# Patient Record
Sex: MALE | Race: WHITE | Employment: OTHER | URBAN - METROPOLITAN AREA
[De-identification: names, ages, dates, MRNs, and addresses within clinical notes are randomized per-mention and may not be internally consistent; named-entity substitution may affect disease eponyms.]

---

## 2018-05-13 ENCOUNTER — HOSPITAL ENCOUNTER (EMERGENCY)
Age: 63
Discharge: HOME | End: 2018-05-13
Attending: EMERGENCY MEDICINE
Payer: SELF-PAY

## 2018-05-13 VITALS
WEIGHT: 185 LBS | OXYGEN SATURATION: 97 % | DIASTOLIC BLOOD PRESSURE: 80 MMHG | BODY MASS INDEX: 27.4 KG/M2 | SYSTOLIC BLOOD PRESSURE: 121 MMHG | HEART RATE: 82 BPM | TEMPERATURE: 97.9 F | HEIGHT: 69 IN | RESPIRATION RATE: 18 BRPM

## 2018-05-13 DIAGNOSIS — S61.411A LACERATION OF RIGHT HAND WITHOUT FOREIGN BODY, INITIAL ENCOUNTER: Primary | ICD-10-CM

## 2018-05-13 PROCEDURE — 77030018836 HC SOL IRR NACL ICUM -A

## 2018-05-13 PROCEDURE — 74011250637 HC RX REV CODE- 250/637: Performed by: EMERGENCY MEDICINE

## 2018-05-13 PROCEDURE — 74011250636 HC RX REV CODE- 250/636: Performed by: STUDENT IN AN ORGANIZED HEALTH CARE EDUCATION/TRAINING PROGRAM

## 2018-05-13 PROCEDURE — 90471 IMMUNIZATION ADMIN: CPT

## 2018-05-13 PROCEDURE — 99282 EMERGENCY DEPT VISIT SF MDM: CPT

## 2018-05-13 PROCEDURE — 90715 TDAP VACCINE 7 YRS/> IM: CPT | Performed by: STUDENT IN AN ORGANIZED HEALTH CARE EDUCATION/TRAINING PROGRAM

## 2018-05-13 PROCEDURE — 77030031132 HC SUT NYL COVD -A

## 2018-05-13 PROCEDURE — 75810000293 HC SIMP/SUPERF WND  RPR

## 2018-05-13 PROCEDURE — 74011000250 HC RX REV CODE- 250: Performed by: EMERGENCY MEDICINE

## 2018-05-13 RX ORDER — LIDOCAINE HYDROCHLORIDE 20 MG/ML
10 INJECTION, SOLUTION EPIDURAL; INFILTRATION; INTRACAUDAL; PERINEURAL
Status: COMPLETED | OUTPATIENT
Start: 2018-05-13 | End: 2018-05-13

## 2018-05-13 RX ADMIN — LIDOCAINE HYDROCHLORIDE 200 MG: 20 INJECTION, SOLUTION EPIDURAL; INFILTRATION; INTRACAUDAL; PERINEURAL at 13:27

## 2018-05-13 RX ADMIN — BACITRACIN ZINC, NEOMYCIN SULFATE, POLYMYXIN B SULFATE 1 PACKET: 3.5; 5000; 4 OINTMENT TOPICAL at 13:27

## 2018-05-13 RX ADMIN — TETANUS TOXOID, REDUCED DIPHTHERIA TOXOID AND ACELLULAR PERTUSSIS VACCINE, ADSORBED 0.5 ML: 5; 2.5; 8; 8; 2.5 SUSPENSION INTRAMUSCULAR at 13:22

## 2018-05-13 NOTE — ED PROVIDER NOTES
HPI Comments: 59 y.o. male with no significant past medical history who presents to the ED with chief complaint of laceration. Pt reports he was standing on a ladder holding a ~4 foot long piece of 50% concrete siding about an hour ago when he slipped and it fell onto his right arm. Pt now reports a laceration to the dorsal aspect of his right wrist. Pt denies any other injuries. Pt states his last tetanus was over 10 years ago. Pt states he is right handed. There are no other acute medical complaints voiced at this time. PCP: Cathi Leon MD    Note written by Jenny Valera, as dictated by Patricio Gallegos MD 1:22 PM     The history is provided by the patient. No past medical history on file. No past surgical history on file. No family history on file. Social History     Social History    Marital status:      Spouse name: N/A    Number of children: N/A    Years of education: N/A     Occupational History    Not on file. Social History Main Topics    Smoking status: Not on file    Smokeless tobacco: Not on file    Alcohol use Not on file    Drug use: Not on file    Sexual activity: Not on file     Other Topics Concern    Not on file     Social History Narrative         ALLERGIES: Review of patient's allergies indicates no known allergies. Review of Systems   Constitutional: Negative. Negative for appetite change, fever and unexpected weight change. HENT: Negative. Negative for ear pain, hearing loss, nosebleeds, rhinorrhea, sore throat and trouble swallowing. Respiratory: Negative. Negative for cough, chest tightness and shortness of breath. Cardiovascular: Negative. Negative for chest pain and palpitations. Gastrointestinal: Negative. Negative for abdominal distention, abdominal pain, blood in stool and vomiting. Endocrine: Negative. Genitourinary: Negative for dysuria and hematuria. Musculoskeletal: Negative.   Negative for back pain and myalgias. Skin: Positive for wound (laceration dorsal aspect of right wrist). Negative for rash. Allergic/Immunologic: Negative. Neurological: Negative. Negative for dizziness, syncope, weakness and numbness. Hematological: Negative. Psychiatric/Behavioral: Negative. All other systems reviewed and are negative. Vitals:    05/13/18 1304   BP: 121/80   Pulse: 82   Resp: 18   Temp: 97.9 °F (36.6 °C)   SpO2: 97%   Weight: 83.9 kg (185 lb)   Height: 5' 9\" (1.753 m)            Physical Exam   Constitutional: He is oriented to person, place, and time. He appears well-developed and well-nourished. No distress. HENT:   Head: Normocephalic and atraumatic. Right Ear: External ear normal.   Left Ear: External ear normal.   Nose: Nose normal.   Mouth/Throat: Oropharynx is clear and moist.   Eyes: Conjunctivae and EOM are normal. Pupils are equal, round, and reactive to light. Neck: Normal range of motion. Neck supple. No JVD present. No thyromegaly present. Cardiovascular: Normal rate, regular rhythm, normal heart sounds and intact distal pulses. No murmur heard. Pulmonary/Chest: Effort normal and breath sounds normal. No respiratory distress. He has no wheezes. He has no rales. Abdominal: Soft. Bowel sounds are normal. He exhibits no distension. There is no tenderness. Musculoskeletal: Normal range of motion. He exhibits no edema. No tendon injury. No foreign body. Neurovascularly intact distally. Neurological: He is alert and oriented to person, place, and time. No cranial nerve deficit. Skin: Skin is warm and dry. No rash noted. 4 cm transverse laceration to dorsal aspect of right hand with mild active venous bleeding. Psychiatric: He has a normal mood and affect. His behavior is normal. Thought content normal.   Nursing note and vitals reviewed.      Note written by Jenny Jin, as dictated by Crystal Ortez MD 1:23 PM    [facility]      ED Course Wound Repair  Date/Time: 5/13/2018 1:54 PM  Performed by: Lyric provider: Kellee Johnston MD  Preparation: skin prepped with Shur-Clens  Location details: right wrist  Wound length:2.6 - 7.5 cm    Anesthesia:  Local Anesthetic: lidocaine 2% without epinephrine  Anesthetic total: 5 mL  Irrigation solution: saline  Skin closure: 4-0 nylon  Number of sutures: 7  Technique: interrupted  Patient tolerance: Patient tolerated the procedure well with no immediate complications    Note written by Jenny Prado, as dictated by Kellee Johnston MD 1:54 PM    PROGRESS NOTE:  2:06 PM   Will discharge home with no prescriptions. Tetanus was updated.

## 2018-05-13 NOTE — DISCHARGE INSTRUCTIONS
Cuts on the Hand Closed With Stitches: Care Instructions  Your Care Instructions    A cut on your hand can be on your fingers, your thumb, or the front or back of your hand. Sometimes a cut can injure the tendons, blood vessels, or nerves of your hand. The doctor used stitches to close the cut. Using stitches also helps the cut heal and reduces scarring. The doctor may have given you a splint to help prevent you from moving your hand, fingers, or thumb. If the cut went deep and through the skin, the doctor put in two layers of stitches. The deeper layer brings the deep part of the cut together. These stitches will dissolve and don't need to be removed. The stitches in the upper layer are the ones you see on the cut. You will probably have a bandage. You will need to have the stitches removed, usually in 7 to 14 days. The doctor may suggest that you see a hand specialist if the cut is very deep or if you have trouble moving your fingers or have less feeling in your hand. The doctor has checked you carefully, but problems can develop later. If you notice any problems or new symptoms, get medical treatment right away. Follow-up care is a key part of your treatment and safety. Be sure to make and go to all appointments, and call your doctor if you are having problems. It's also a good idea to know your test results and keep a list of the medicines you take. How can you care for yourself at home? · Keep the cut dry for the first 24 to 48 hours. After this, you can shower if your doctor okays it. Pat the cut dry. · Don't soak the cut, such as in a bathtub. Your doctor will tell you when it's safe to get the cut wet. · If your doctor told you how to care for your cut, follow your doctor's instructions. If you did not get instructions, follow this general advice:  ¨ After the first 24 to 48 hours, wash around the cut with clean water 2 times a day.  Don't use hydrogen peroxide or alcohol, which can slow healing. ¨ You may cover the cut with a thin layer of petroleum jelly, such as Vaseline, and a nonstick bandage. ¨ Apply more petroleum jelly and replace the bandage as needed. · Prop up the sore hand on a pillow anytime you sit or lie down during the next 3 days. Try to keep it above the level of your heart. This will help reduce swelling. · Avoid any activity that could cause your cut to reopen. · Do not remove the stitches on your own. Your doctor will tell you when to come back to have the stitches removed. · Be safe with medicines. Take pain medicines exactly as directed. ¨ If the doctor gave you a prescription medicine for pain, take it as prescribed. ¨ If you are not taking a prescription pain medicine, ask your doctor if you can take an over-the-counter medicine. When should you call for help? Call your doctor now or seek immediate medical care if:  ? · You have new pain, or your pain gets worse. ? · The skin near the cut is cold or pale or changes color. ? · You have tingling, weakness, or numbness near the cut.   ? · The cut starts to bleed, and blood soaks through the bandage. Oozing small amounts of blood is normal.   ? · You have trouble moving the area of the hand near the cut.   ? · You have symptoms of infection, such as:  ¨ Increased pain, swelling, warmth, or redness around the cut. ¨ Red streaks leading from the cut. ¨ Pus draining from the cut. ¨ A fever. ? Watch closely for changes in your health, and be sure to contact your doctor if:  ? · You do not get better as expected. Where can you learn more? Go to http://pete-lamar.info/. Enter T250 in the search box to learn more about \"Cuts on the Hand Closed With Stitches: Care Instructions. \"  Current as of: March 20, 2017  Content Version: 11.4  © 3491-4168 [employer].  Care instructions adapted under license by [employer] (which disclaims liability or warranty for this information). If you have questions about a medical condition or this instruction, always ask your healthcare professional. [place] [address] any warranty or liability for your use of this information.

## 2024-06-07 ENCOUNTER — HOSPITAL ENCOUNTER (OUTPATIENT)
Age: 69
Discharge: HOME | End: 2024-06-07
Payer: COMMERCIAL

## 2024-06-07 ENCOUNTER — OFFICE VISIT (OUTPATIENT)
Dept: FAMILY MEDICINE CLINIC | Facility: CLINIC | Age: 69
End: 2024-06-07
Payer: MEDICARE

## 2024-06-07 ENCOUNTER — APPOINTMENT (OUTPATIENT)
Dept: GENERAL RADIOLOGY | Age: 69
End: 2024-06-07
Attending: NURSE PRACTITIONER
Payer: COMMERCIAL

## 2024-06-07 VITALS
BODY MASS INDEX: 31.14 KG/M2 | RESPIRATION RATE: 18 BRPM | TEMPERATURE: 98 F | SYSTOLIC BLOOD PRESSURE: 158 MMHG | HEIGHT: 73 IN | HEART RATE: 64 BPM | WEIGHT: 235 LBS | DIASTOLIC BLOOD PRESSURE: 92 MMHG | OXYGEN SATURATION: 95 %

## 2024-06-07 VITALS
BODY MASS INDEX: 31.81 KG/M2 | RESPIRATION RATE: 18 BRPM | SYSTOLIC BLOOD PRESSURE: 143 MMHG | HEIGHT: 73 IN | DIASTOLIC BLOOD PRESSURE: 90 MMHG | OXYGEN SATURATION: 97 % | HEART RATE: 80 BPM | TEMPERATURE: 98 F | WEIGHT: 240 LBS

## 2024-06-07 DIAGNOSIS — Z02.9 ADMINISTRATIVE ENCOUNTER: Primary | ICD-10-CM

## 2024-06-07 DIAGNOSIS — M79.5 FOREIGN BODY (FB) IN SOFT TISSUE: Primary | ICD-10-CM

## 2024-06-07 DIAGNOSIS — S91.302A OPEN WOUND OF LEFT FOOT, INITIAL ENCOUNTER: ICD-10-CM

## 2024-06-07 DIAGNOSIS — S91.302A WOUND OF LEFT FOOT: ICD-10-CM

## 2024-06-07 PROBLEM — I25.10 CORONARY ATHEROSCLEROSIS: Status: ACTIVE | Noted: 2017-06-12

## 2024-06-07 PROCEDURE — 99203 OFFICE O/P NEW LOW 30 MIN: CPT | Performed by: NURSE PRACTITIONER

## 2024-06-07 PROCEDURE — 73630 X-RAY EXAM OF FOOT: CPT | Performed by: NURSE PRACTITIONER

## 2024-06-07 RX ORDER — ASPIRIN 81 MG/1
81 TABLET ORAL DAILY
COMMUNITY

## 2024-06-07 RX ORDER — SIMVASTATIN 20 MG
20 TABLET ORAL DAILY
COMMUNITY

## 2024-06-07 RX ORDER — EZETIMIBE 10 MG/1
10 TABLET ORAL
COMMUNITY
Start: 2024-04-01 | End: 2024-06-07

## 2024-06-07 RX ORDER — HYDROCORTISONE ACETATE 25 MG/1
SUPPOSITORY RECTAL
COMMUNITY
Start: 2024-04-24

## 2024-06-07 RX ORDER — CEPHALEXIN 500 MG/1
500 CAPSULE ORAL 3 TIMES DAILY
Qty: 21 CAPSULE | Refills: 0 | Status: SHIPPED | OUTPATIENT
Start: 2024-06-07 | End: 2024-06-14

## 2024-06-07 NOTE — DISCHARGE INSTRUCTIONS
Follow-up with your primary care provider for all of your healthcare needs  Keep the foot clean and dry, apply topical Neosporin or bacitracin area  Finish the full course antibiotics  Return to the emergency room for symptoms or concerns

## 2024-06-07 NOTE — PROGRESS NOTES
Toby Vizcarra is a 69 year old male who presents to [facility] with c/o possible FB interdigital webspace L foot between 3/4 digit x 1 week.  Noted blood at area with small puncture wound, topical neosporin and hydrogen peroxide without healing. Sensation of FB at site.  Denies recalled injury/wound, however admits [place] and may have been distracted with other activities.        Denies fever, no chills/sweats, no purulent discharge, no red streaking.   Last TDAP in 2017.   Denies h/o DM, no h/o PVD nor neuropathy.   H/o CAD on anti HTN, cholesterol, and antiplatelet tx.     OBJECTIVE:   /90   Pulse 80   Temp 97.9 °F (36.6 °C)   Resp 18   Ht 6' 1\" (1.854 m)   Wt 240 lb (108.9 kg)   SpO2 97%   BMI 31.66 kg/m²   GENERAL A & O X 3 in nad, pleasant, comfortable  SKIN (+) 2 mm puncture wound vs. Fissure interdigital webspace 3/4 digit LF, maceration at borders, dried blood distally, erythema/mild edema at inferior margin of wound.  No visible FB, cap refill < 2 sec, no pain with lateral compression of metatarsals, no lymphangitic streaking nor induration.   DP pulse 2+, sensation grossly intact.     Accompanied by: spouse  After triage, higher acuity of care was recommended to Toby Vizcarra today.   Rationale: Need for further evaluation and management outside the scope of practice for the walk in clinic  Site recommendation: Referred to [facility] for imaging to r/o FB.  Advised to stop hydrogen peroxide as this is impeding healing.  Report given to Himanshu SAWANT.    Pt discharged in stable condition via private car with spouse.   Electronically signed,   Sasha Craft PA-C,  6/7/2024, 12:34 PM

## 2024-06-07 NOTE — ED INITIAL ASSESSMENT (HPI)
Pt with co pain to the left foot. Noticed it bleeding the last couple of days and noted to have a small hole between toes.

## 2024-06-07 NOTE — ED PROVIDER NOTES
Patient Seen in: [facility]      History     Chief Complaint   Patient presents with    Pain     Stated Complaint: FOOT PAIN    Subjective:   HPI    69-year-old male presents to me care with possible foreign body between the fourth and fifth digits of the left foot.  P states he has had pain between the digits for the last week.  Unsure if he stepped on something has been wearing his foot flops and going barefoot more recent.  Did notice a small puncture wound to the foot area with some bleeding.  No redness pain with walking.  No issues with concerns.  The patient's medication list, past medical history and social history elements as listed in today's nurse's notes were reviewed and agreed (except as otherwise stated in the HPI).  The patient's family history reviewed and determined to be noncontributory to the presenting problem.      Objective:   Past Medical History:    Hyperlipidemia              Past Surgical History:   Procedure Laterality Date    Heart surgery      Other surgical history      TAVR                Social History     Socioeconomic History    Marital status:      Social Determinants of Health     Financial Resource Strain: Medium Risk (4/16/2022)    Received from [facility]    Financial Resource Strain     Financial Concerns: 2   Transportation Needs: Unknown (10/19/2021)    Received from [facility]    Transportation Needs     Lack of Transportation (Medical): 0   Physical Activity: Unknown (10/19/2021)    Received from [facility]    Physical Activity     Calculated Minutes of Exercise per Week:: 0   Stress: Unknown (10/19/2021)    Received from [facility]    Stress     Patient Reported Major Stressor(s): 0     Patient Reported Strengths: 1     Patient Reported Source(s) of Support: 1   Social Connections: Unknown (10/19/2021)    Received from [facility]    Social  Connections     Social Connection Calculated Score: 0   Housing Stability: Unknown (10/19/2021)    Received from [facility]    Housing Stability     Housing Concerns: 0              Review of Systems    Positive for stated complaint: FOOT PAIN  Other systems are as noted in HPI.  Constitutional and vital signs reviewed.      All other systems reviewed and negative except as noted above.    Physical Exam     ED Triage Vitals [06/07/24 1300]   BP (!) 158/92   Pulse 64   Resp 18   Temp 97.5 °F (36.4 °C)   Temp src Temporal   SpO2 95 %   O2 Device None (Room air)       Current Vitals:   Vital Signs  BP: (!) 158/92  Pulse: 64  Resp: 18  Temp: 97.5 °F (36.4 °C)  Temp src: Temporal    Oxygen Therapy  SpO2: 95 %  O2 Device: None (Room air)            Physical Exam    GENERAL: The patient is well-developed well-nourished nontoxic, non-ill-appearing    CHEST/LUNGS: There is no respiratory distress noted.  HEART/CARDIOVASCULAR:.  There is no tachycardia.   SKIN: There is no rash.  Small puncture wound is noted between the fourth and fifth digits of the left foot.  No surrounding erythema no drainage noted.  No abscess no foreign bodies appreciated  NEURO: The patient is awake, alert, and oriented.  The patient is cooperative.    ED Course   Labs Reviewed - No data to display     XR FOOT, COMPLETE (MIN 3 VIEWS), LEFT (CPT=73630)    Result Date: 6/7/2024  PROCEDURE:  XR FOOT, COMPLETE (MIN 3 VIEWS), LEFT (CPT=73630)  TECHNIQUE:  AP, oblique, and lateral views were obtained.  COMPARISON:  None.  INDICATIONS:  FOOT PAIN  PATIENT STATED HISTORY: (As transcribed by Technologist)  The patient has left foot pain for several days with no known injury or trauma he has a small hole between his toes. He noticed bleeding to the foot a couple days ago.    FINDINGS:  No acute fractures or osseous lesions are identified.  Phalangeal relationships are within normal limits.  No significant ankle joint effusion.  Calcaneal  enthesophytes.  Osteoarthritic changes greatest at the 1st MTP joints.            CONCLUSION:  No acute fractures.  Osteoarthritic changes greatest the 1st MTP joint.   LOCATION:  [facility]   Dictated by (CST): Shelby Rodriguez MD on 6/07/2024 at 1:36 PM     Finalized by (CST): Shelby Rodriguez MD on 6/07/2024 at 1:38 PM               MDM   Pertinent Labs & Imaging studies reviewed. (See chart for details)  Differential diagnosis considered but not limited to: Foreign body, infected wound, puncture wound  Patient coming in with possible foreign body to the foot.  Radiology see above. Will treat for possible foreign body of foot. Will discharge on Keflex. Patient is comfortable with this plan.  Overall Pt looks good. Non-toxic, well-hydrated and in no respiratory distress. Vital signs are reassuring. Exam is reassuring. I do not believe pt  requires and additional  diagnostic studiesor intervention. I believe pt  can be discharged home to continue evaluation as an outpatient. Follow-up provider given. Discharge instructions given and reviewed. Return for any problems. All understand and agreewith the plan.    Please note that this report has been produced using speech recognition software and may contain errors related to that system including, but not limited to, errors in grammar, punctuation, and spelling, as well as words and phrases that possibly may have been recognized inappropriately.  If there are any questions or concerns, contact the dictating provider for clarification.    Note to patient: The 21st Century Cures Act makes medical notes like these available to patients in the interest of transparency. However, this is a medical document intended as peer to peer communication. It is written in medical language and may contain abbreviations or verbiage that are unfamiliar. It may appear blunt or direct. Medical documents are intended to carry relevant information, facts as evident, and the clinical opinion of  the practitioner.                                    Medical Decision Making      Disposition and Plan     Clinical Impression:  1. Foreign body (FB) in soft tissue    2. Open wound of left foot, initial encounter         Disposition:  Discharge  6/7/2024  1:43 pm    Follow-up:  No follow-up provider specified.        Medications Prescribed:  Discharge Medication List as of 6/7/2024  1:44 PM        START taking these medications    Details   cephalexin 500 MG Oral Cap Take 1 capsule (500 mg total) by mouth 3 (three) times daily for 7 days., Normal, Disp-21 capsule, R-0

## 2024-06-17 ENCOUNTER — OFFICE VISIT (OUTPATIENT)
Dept: FAMILY MEDICINE CLINIC | Facility: CLINIC | Age: 69
End: 2024-06-17
Payer: MEDICARE

## 2024-06-17 VITALS
SYSTOLIC BLOOD PRESSURE: 137 MMHG | DIASTOLIC BLOOD PRESSURE: 100 MMHG | WEIGHT: 235 LBS | RESPIRATION RATE: 18 BRPM | TEMPERATURE: 97 F | BODY MASS INDEX: 31 KG/M2 | HEART RATE: 77 BPM | OXYGEN SATURATION: 96 %

## 2024-06-17 DIAGNOSIS — M79.672 LEFT FOOT PAIN: ICD-10-CM

## 2024-06-17 DIAGNOSIS — R03.0 ELEVATED BLOOD PRESSURE READING: Primary | ICD-10-CM

## 2024-06-17 DIAGNOSIS — T14.8XXA ABRASION: ICD-10-CM

## 2024-06-17 PROCEDURE — 99213 OFFICE O/P EST LOW 20 MIN: CPT | Performed by: NURSE PRACTITIONER

## 2024-06-17 NOTE — PROGRESS NOTES
CHIEF COMPLAINT:     Chief Complaint   Patient presents with    Foot Pain          HPI:    Toby Vizcarra is a 69 year old male who presents for evaluation of a skin abrasion in between 3rd and 4th toe. Pt was put on keflex over a week ago and completed medication, but is still having discomfort. Area per patient has improved greatly, but is  and draining some. Pertinent negatives include no anorexia, congestion, cough, diarrhea, eye pain, facial edema, fatigue, fever, joint pain, rhinorrhea, shortness of breath, sore throat or vomiting.      Current Outpatient Medications   Medication Sig Dispense Refill    aspirin 81 MG Oral Tab EC Take 1 tablet (81 mg total) by mouth daily.      hydrocortisone 2.5 % External Cream APPLY TOPICALLY DAILY TO AFFECTED PERIANAL AREA UP TO THREE TIMES DAILY      hydrocortisone 25 MG Rectal Suppos UNWRAP AND INSERT OR APPLY 1 SUPPOSITORY RECTALLY NIGHTLY FOR 60 DAYS.      simvastatin 20 MG Oral Tab Take 1 tablet (20 mg total) by mouth daily.        Past Medical History:    Hyperlipidemia      Past Surgical History:   Procedure Laterality Date    Heart surgery      Other surgical history      TAVR      History reviewed. No pertinent family history.   Social History     Socioeconomic History    Marital status:      Social Determinants of Health     Financial Resource Strain: Medium Risk (4/16/2022)    Received from [facility]    Financial Resource Strain     Financial Concerns: 2   Transportation Needs: Unknown (10/19/2021)    Received from [facility]    Transportation Needs     Lack of Transportation (Medical): 0   Physical Activity: Unknown (10/19/2021)    Received from [facility]    Physical Activity     Calculated Minutes of Exercise per Week:: 0   Stress: Unknown (10/19/2021)    Received from [facility]    Stress     Patient Reported Major Stressor(s): 0     Patient Reported  Strengths: 1     Patient Reported Source(s) of Support: 1   Social Connections: Unknown (10/19/2021)    Received from [facility]    Social Connections     Social Connection Calculated Score: 0   Housing Stability: Unknown (10/19/2021)    Received from [facility]    Housing Stability     Housing Concerns: 0         REVIEW OF SYSTEMS:   GENERAL: feels well otherwise, no fever, no chills.  SKIN: Per HPI. No edema. No ulcerations.  HEENT: Denies rhinorrhea, edema of the lips or swelling of throat.  CARDIOVASCULAR: Denies chest pains or palpitations.  LUNGS: Denies shortness of breath with exertion or rest. No cough or wheezing.  LYMPH: Denies enlargement of the lymph nodes.  NEURO: Denies abnormal sensation, tingling of the skin, or numbness.      EXAM:   BP (!) 137/100   Pulse 77   Temp 97.3 °F (36.3 °C)   Resp 18   Wt 235 lb (106.6 kg)   SpO2 96%   BMI 31.00 kg/m²   GENERAL: well developed, well nourished,in no apparent distress  SKIN: between 3rd and 4th toes small pen point size hole that has granulated tissue noted inside. No drainage noted, but + tenderness with palpitation. Area has no redness or warmth. No edema.  EYES: PERRLA, EOMI, conjunctiva are clear  HENT: Head atraumatic, normocephalic. TM's WNL bilaterally. Normal external nose. Nasal mucosa pink without edema. No erythema of the throat. Oropharynx moist without lesions.  LUNGS: Clear to auscultation bilaterally.  No wheezing, rhonchi, or rales.  No diminished breath sounds. No increased work of breathing.   CARDIO: RRR without murmur  JOINTS: normal ROM   LYMPH: No  lymphadenopathy.     ASSESSMENT AND PLAN:   Toby Vizcarra is a 69 year old male who presents for evaluation of a rash. Findings are consistent with:    ASSESSMENT:  Encounter Diagnoses   Name Primary?    Elevated blood pressure reading Yes    Left foot pain     Abrasion        PLAN: Meds as listed below.  Comfort measures as described in  Patient Instructions.  Skin care discussed with patient.   Educated on following up with podiatry- pt is from [place] and will follow up when he goes back home  Educated on supportive measures: ibuprofen/tylenol, epsom salt soaks, keeping area clean and dry  Educated on s/s of worsening sx and when to seek higher level of care  Follow up with pcp if not improving    Meds & Refills for this Visit:  Requested Prescriptions      No prescriptions requested or ordered in this encounter         Risk and benefits of medication discussed.     The patient indicates understanding of these issues and agrees to the plan.  The patient is asked to return in 3 days if sx's persist or worsen.

## 2024-06-24 ENCOUNTER — TELEPHONE (OUTPATIENT)
Dept: VASCULAR SURGERY | Facility: CLINIC | Age: 69
End: 2024-06-24
Payer: COMMERCIAL

## 2024-06-24 NOTE — TELEPHONE ENCOUNTER
Called patient to inform him of his appointment on 06/28/24 at 10:00 am. Patient verbalized understanding and will call with any questions.

## 2024-06-24 NOTE — PROGRESS NOTES
[facility] - PODIATRY    Today's Date: 06/28/2024     Patient Name: Abisai Bertrand  MRN: [id number]  CSN: [id number]  PCP: System, Provider Not In  Referring Provider: Referring, Self    SUBJECTIVE     Chief Complaint   Patient presents with   • Follow-up     Pt is here today for self referral from injury had a splinter in foot doesn't think he got it all has been doctored up by  in [place] had xrays but doesn't have a disc with him. Has an open wound between 3rd and fourth toe-pt reports pain level 2/10     HPI: Abisai Bertrand, a 69 y.o.male, comes to clinic as a(n) new patient complaining of foot pain. Patient has h/o high cholesterol . Patient reports he had a wooden splinter between his left 3rd and 4th toes approximately 3 weeks ago. Reports he was able to remove part of the splinter, but continues to have pain.  He is currently traveling across the country in an RV and was previously seen in [facility]. He had previous xrays of left foot and was given oral antibiotics.  Reports he has been soaking the foot in Epsom salt daily. Patient is non-diabetic. After completing his antibiotics he still has an open wound between the toes and pain upon palpation of the plantar surface of the foot. Admits pain at 2/10 level, described as shooting and aching, and centered around left 3rd and 4th toe interspace . Relates previous treatment(s) including oral antibiotics and urgent care . Denies any constitutional symptoms. No other pedal complaints at this time.    Past Medical History:   Diagnosis Date   • High cholesterol      Past Surgical History:   Procedure Laterality Date   • CARDIAC SURGERY     • CARDIAC VALVE SURGERY     • KNEE SURGERY      Dr. Perez in [place]     History reviewed. No pertinent family history.  Social History     Socioeconomic History   • Marital status:    Tobacco Use   • Smoking status: Never     Passive exposure: Never   • Smokeless tobacco: Never   Vaping Use   • Vaping  status: Never Used   Substance and Sexual Activity   • Alcohol use: Not Currently   • Drug use: Defer   • Sexual activity: Defer     No Known Allergies  Current Outpatient Medications   Medication Sig Dispense Refill   • aspirin 81 MG EC tablet Take 1 tablet by mouth Daily.     • ezetimibe (ZETIA) 10 MG tablet TAKE 1 TABLET BY MOUTH DAILY BEFORE A MEAL     • simvastatin (ZOCOR) 20 MG tablet Take 1 tablet by mouth.     • doxycycline (VIBRAMYCIN) 100 MG capsule Take 1 capsule by mouth 2 (Two) Times a Day for 10 days. 20 capsule 0   • mupirocin (BACTROBAN) 2 % cream Apply 1 Application topically to the appropriate area as directed 3 (Three) Times a Day. 30 g 0     No current facility-administered medications for this visit.     Review of Systems   Constitutional:  Negative for activity change.   HENT:  Negative for congestion.    Respiratory:  Negative for apnea and shortness of breath.    Gastrointestinal:  Negative for abdominal pain.   Musculoskeletal:  Negative for arthralgias and back pain.        Foot pain     Skin:  Positive for wound (interdigital left 3rd and 4th toe).   Psychiatric/Behavioral:  Negative for agitation, behavioral problems and confusion.      OBJECTIVE     Vitals:    06/28/24 0948   BP: 130/88   Pulse: 67   SpO2: 97%       PHYSICAL EXAM  GEN:   Accompanied by none.     Foot/Ankle Exam    GENERAL  Appearance:  appears stated age  Orientation:  AAOx3  Affect:  appropriate  Gait:  unimpaired  Assistance:  independent  Right shoe gear: casual shoe  Left shoe gear: casual shoe    VASCULAR     Right Foot Vascularity   Dorsalis pedis:  2+  Posterior tibial:  2+  Skin temperature:  warm  Edema grading:  None  CFT:  3  Pedal hair growth:  Present  Varicosities:  none     Left Foot Vascularity   Dorsalis pedis:  2+  Posterior tibial:  2+  Skin temperature:  warm  Edema grading:  None  CFT:  3  Pedal hair growth:  Present  Varicosities:  none     NEUROLOGIC     Right Foot Neurologic   Normal sensation     Light touch sensation: normal  Vibratory sensation: normal  Hot/Cold sensation: normal  Protective Sensation using [place] Monofilament:   Sites intact: 10  Sites tested: 10     Left Foot Neurologic   Normal sensation    Light touch sensation: normal  Vibratory sensation: normal  Hot/Cold sensation:  normal  Protective Sensation using [place] Monofilament:   Sites intact: 10  Sites tested: 10    MUSCULOSKELETAL     Left Foot Musculoskeletal   Ecchymosis:  none  Tenderness:  toe 3 tenderness and toe 4 tenderness  Arch:  Normal    MUSCLE STRENGTH     Left Foot Muscle Strength   Foot dorsiflexion:  5  Foot plantar flexion:  5  Foot inversion:  5  Foot eversion:  5    RANGE OF MOTION     Left Foot Range of Motion   Foot and ankle ROM within normal limits      DERMATOLOGIC        Left Foot Dermatologic   Skin  Left foot skin is intact.     Image:     RADIOLOGY/NUCLEAR:  XR FOOT, COMPLETE (MIN 3 VIEWS), LEFT (CPT=73630)    Result Date: 6/7/2024  Narrative: PROCEDURE:  XR FOOT, COMPLETE (MIN 3 VIEWS), LEFT (CPT=73630) TECHNIQUE:  AP, oblique, and lateral views were obtained. COMPARISON:  None. INDICATIONS:  FOOT PAIN PATIENT STATED HISTORY: (As transcribed by Technologist)  The patient has left foot pain for several days with no known injury or trauma he has a small hole between his toes. He noticed bleeding to the foot a couple days ago.    FINDINGS:   No acute fractures or osseous lesions are identified.  Phalangeal relationships are within normal limits.  No significant ankle joint effusion.  Calcaneal enthesophytes.  Osteoarthritic changes greatest at the 1st MTP joints.        Impression: CONCLUSION:  No acute fractures.  Osteoarthritic changes greatest the 1st MTP joint. LOCATION:  [facility] Dictated by (CST): Cholo Oro MD on 6/07/2024 at 1:36 PM     Finalized by (CST): Cholo Oro MD on 6/07/2024 at 1:38 PM        LABORATORY/CULTURE RESULTS:      PATHOLOGY RESULTS:       ASSESSMENT/PLAN      Diagnoses and all orders for this visit:    1. Foreign body (FB) in soft tissue (Primary)  -     doxycycline (VIBRAMYCIN) 100 MG capsule; Take 1 capsule by mouth 2 (Two) Times a Day for 10 days.  Dispense: 20 capsule; Refill: 0  -     mupirocin (BACTROBAN) 2 % cream; Apply 1 Application topically to the appropriate area as directed 3 (Three) Times a Day.  Dispense: 30 g; Refill: 0    2. Left foot pain    3. Splinter of left foot, initial encounter    4. Open wound of left foot, initial encounter      Comprehensive lower extremity examination and evaluation was performed.  Discussed findings and treatment plan including risks, benefits, and treatment options with patient in detail. Patient agreed with treatment plan.  Patient will increase Epsom salt soaks to 3 times daily.   Begin use of mupirocin ointment to open area.   Begin use of doxycycline orally BID.    Return in 4 days for reevaluation.    An After Visit Summary was printed and given to the patient at discharge, including (if requested) any available informative/educational handouts regarding diagnosis, treatment, or medications. All questions were answered to patient/family satisfaction. Should symptoms fail to improve or worsen they agree to call or return to clinic or to go to the Emergency Department. Discussed the importance of following up with any needed screening tests/labs/specialist appointments and any requested follow-up recommended by me today. Importance of maintaining follow-up discussed and patient accepts that missed appointments can delay diagnosis and potentially lead to worsening of conditions.  Return in about 4 days (around 7/2/2024) for Follow-up with Podiatry BETI, Recheck., or sooner if acute issues arise.      This document has been electronically signed by BETI Vieira on June 28, 2024 10:44 CDT

## 2024-06-27 ENCOUNTER — TELEPHONE (OUTPATIENT)
Age: 69
End: 2024-06-27
Payer: COMMERCIAL

## 2024-06-27 NOTE — TELEPHONE ENCOUNTER
Hub to relay  Called patient regarding appt on 06/28/2024. Left message for patient to return call if any questions or concerns arise.

## 2024-06-28 ENCOUNTER — OFFICE VISIT (OUTPATIENT)
Age: 69
End: 2024-06-28
Payer: COMMERCIAL

## 2024-06-28 VITALS
DIASTOLIC BLOOD PRESSURE: 88 MMHG | HEIGHT: 73 IN | OXYGEN SATURATION: 97 % | SYSTOLIC BLOOD PRESSURE: 130 MMHG | BODY MASS INDEX: 31.14 KG/M2 | WEIGHT: 235 LBS | HEART RATE: 67 BPM

## 2024-06-28 DIAGNOSIS — S90.852A SPLINTER OF LEFT FOOT, INITIAL ENCOUNTER: ICD-10-CM

## 2024-06-28 DIAGNOSIS — S91.302A OPEN WOUND OF LEFT FOOT, INITIAL ENCOUNTER: ICD-10-CM

## 2024-06-28 DIAGNOSIS — M79.5 FOREIGN BODY (FB) IN SOFT TISSUE: Primary | ICD-10-CM

## 2024-06-28 DIAGNOSIS — M79.672 LEFT FOOT PAIN: ICD-10-CM

## 2024-06-28 PROBLEM — Z96.659 S/P TOTAL KNEE ARTHROPLASTY: Status: ACTIVE | Noted: 2021-04-14

## 2024-06-28 PROBLEM — Z95.2 S/P AORTIC VALVE REPLACEMENT: Status: ACTIVE | Noted: 2017-06-12

## 2024-06-28 PROBLEM — K21.9 ESOPHAGEAL REFLUX: Status: ACTIVE | Noted: 2017-06-12

## 2024-06-28 PROBLEM — I35.1 NONRHEUMATIC AORTIC (VALVE) INSUFFICIENCY: Status: ACTIVE | Noted: 2024-06-28

## 2024-06-28 PROBLEM — I77.1 TORTUOUS AORTA: Status: ACTIVE | Noted: 2022-12-20

## 2024-06-28 PROBLEM — Z95.1 S/P CABG X 3: Status: ACTIVE | Noted: 2017-06-12

## 2024-06-28 PROBLEM — I35.9 NONRHEUMATIC AORTIC VALVE DISORDER, UNSPECIFIED: Status: ACTIVE | Noted: 2024-06-28

## 2024-06-28 PROBLEM — I25.10 CORONARY ATHEROSCLEROSIS: Status: ACTIVE | Noted: 2017-06-12

## 2024-06-28 RX ORDER — ASPIRIN 81 MG/1
1 TABLET ORAL DAILY
COMMUNITY

## 2024-06-28 RX ORDER — SIMVASTATIN 20 MG
20 TABLET ORAL
COMMUNITY

## 2024-06-28 RX ORDER — EZETIMIBE 10 MG/1
TABLET ORAL
COMMUNITY
Start: 2024-04-01

## 2024-06-28 RX ORDER — DOXYCYCLINE HYCLATE 100 MG/1
100 CAPSULE ORAL 2 TIMES DAILY
Qty: 20 CAPSULE | Refills: 0 | Status: SHIPPED | OUTPATIENT
Start: 2024-06-28 | End: 2024-07-08

## 2024-06-28 RX ORDER — MUPIROCIN CALCIUM 20 MG/G
1 CREAM TOPICAL 3 TIMES DAILY
Qty: 30 G | Refills: 0 | Status: SHIPPED | OUTPATIENT
Start: 2024-06-28

## 2024-07-01 NOTE — PROGRESS NOTES
[facility] - PODIATRY    Today's Date: 07/02/2024     Patient Name: Abisai Bertrand  MRN: [id number]  CSN: [id number]  PCP: System, Provider Not In  Referring Provider: No ref. provider found    SUBJECTIVE     Chief Complaint   Patient presents with    Follow-up     System, Provider Not In-4 DAY RECHECK     HPI: Abisai Bertrand, a 69 y.o.male, comes to clinic as a(n) established patient for follow-up treatment of left third and fourth toe splinter . Patient has h/o high cholesterol . Patient reports he had a wooden splinter between his left 3rd and 4th toes approximately 3 weeks ago. Reports he has continued Epsom salt soaks 3 times a day.  He also reports he has been taking his doxycycline and using mupirocin ointment to the wound.  He reports no pain today when area is pressed.  He reports he feels like the splinter has come out and the wound has begin to heal.  Patient is non-diabetic.  Denies pain. Relates previous treatment(s) including oral antibiotics and urgent care . Denies any constitutional symptoms. No other pedal complaints at this time.    Past Medical History:   Diagnosis Date    High cholesterol      Past Surgical History:   Procedure Laterality Date    CARDIAC SURGERY      CARDIAC VALVE SURGERY      KNEE SURGERY      Dr. Perez in [place]     History reviewed. No pertinent family history.  Social History     Socioeconomic History    Marital status:    Tobacco Use    Smoking status: Never     Passive exposure: Never    Smokeless tobacco: Never   Vaping Use    Vaping status: Never Used   Substance and Sexual Activity    Alcohol use: Not Currently    Drug use: Never    Sexual activity: Defer     No Known Allergies  Current Outpatient Medications   Medication Sig Dispense Refill    aspirin 81 MG EC tablet Take 1 tablet by mouth Daily.      doxycycline (VIBRAMYCIN) 100 MG capsule Take 1 capsule by mouth 2 (Two) Times a Day for 10 days. 20 capsule 0    ezetimibe (ZETIA) 10 MG tablet TAKE 1  TABLET BY MOUTH DAILY BEFORE A MEAL      mupirocin (BACTROBAN) 2 % ointment Apply 1 Application topically to the appropriate area as directed Daily.      simvastatin (ZOCOR) 20 MG tablet Take 1 tablet by mouth.      mupirocin (BACTROBAN) 2 % cream Apply 1 Application topically to the appropriate area as directed 3 (Three) Times a Day. (Patient not taking: Reported on 7/2/2024) 30 g 0     No current facility-administered medications for this visit.     Review of Systems   Constitutional:  Negative for activity change.   HENT:  Negative for congestion.    Respiratory:  Negative for apnea and shortness of breath.    Gastrointestinal:  Negative for abdominal pain.   Musculoskeletal:  Negative for arthralgias and back pain.        Foot pain     Skin:  Positive for wound (interdigital left 3rd and 4th toe).   Psychiatric/Behavioral:  Negative for agitation, behavioral problems and confusion.        OBJECTIVE     Vitals:    07/02/24 0801   BP: 124/84   Pulse: 74   Resp: 18   SpO2: 98%       PHYSICAL EXAM  GEN:   Accompanied by none.     Foot/Ankle Exam    GENERAL  Appearance:  appears stated age  Orientation:  AAOx3  Affect:  appropriate  Gait:  unimpaired  Assistance:  independent  Right shoe gear: casual shoe  Left shoe gear: casual shoe    VASCULAR     Right Foot Vascularity   Dorsalis pedis:  2+  Posterior tibial:  2+  Skin temperature:  warm  Edema grading:  None  CFT:  3  Pedal hair growth:  Present  Varicosities:  none     Left Foot Vascularity   Dorsalis pedis:  2+  Posterior tibial:  2+  Skin temperature:  warm  Edema grading:  None  CFT:  3  Pedal hair growth:  Present  Varicosities:  none     NEUROLOGIC     Right Foot Neurologic   Normal sensation    Light touch sensation: normal  Vibratory sensation: normal  Hot/Cold sensation: normal  Protective Sensation using [place] Monofilament:   Sites intact: 10  Sites tested: 10     Left Foot Neurologic   Normal sensation    Light touch sensation:  normal  Vibratory sensation: normal  Hot/Cold sensation:  normal  Protective Sensation using [place]-Victor Hugo Monofilament:   Sites intact: 10  Sites tested: 10    MUSCULOSKELETAL     Right Foot Musculoskeletal   Ecchymosis:  none  Tenderness:  none       Left Foot Musculoskeletal   Ecchymosis:  none  Tenderness:  none  Arch:  Normal    MUSCLE STRENGTH     Left Foot Muscle Strength   Foot dorsiflexion:  5  Foot plantar flexion:  5  Foot inversion:  5  Foot eversion:  5    RANGE OF MOTION     Left Foot Range of Motion   Foot and ankle ROM within normal limits      DERMATOLOGIC      Right Foot Dermatologic   Skin  Right foot skin is intact.      Left Foot Dermatologic   Skin  Left foot skin is intact.     Image:       RADIOLOGY/NUCLEAR:  XR FOOT, COMPLETE (MIN 3 VIEWS), LEFT (CPT=73630)    Result Date: 6/7/2024  Narrative: PROCEDURE:  XR FOOT, COMPLETE (MIN 3 VIEWS), LEFT (CPT=73630) TECHNIQUE:  AP, oblique, and lateral views were obtained. COMPARISON:  None. INDICATIONS:  FOOT PAIN PATIENT STATED HISTORY: (As transcribed by Technologist)  The patient has left foot pain for several days with no known injury or trauma he has a small hole between his toes. He noticed bleeding to the foot a couple days ago.    FINDINGS:   No acute fractures or osseous lesions are identified.  Phalangeal relationships are within normal limits.  No significant ankle joint effusion.  Calcaneal enthesophytes.  Osteoarthritic changes greatest at the 1st MTP joints.        Impression: CONCLUSION:  No acute fractures.  Osteoarthritic changes greatest the 1st MTP joint. LOCATION:  [facility] Dictated by (CST): Cholo Oro MD on 6/07/2024 at 1:36 PM     Finalized by (CST): Cholo Oro MD on 6/07/2024 at 1:38 PM        LABORATORY/CULTURE RESULTS:      PATHOLOGY RESULTS:       ASSESSMENT/PLAN     Diagnoses and all orders for this visit:    1. Splinter of left foot, subsequent encounter (Primary)        Comprehensive lower extremity  examination and evaluation was performed.  Discussed findings and treatment plan including risks, benefits, and treatment options with patient in detail. Patient agreed with treatment plan.  Patient will continue Epsom salt soaks 3 times daily until oral antibiotics are completed.   Patient will continue doxycycline until completed.  Patient will return to clinic if he has increased pain, drainage, erythema, or any other signs of infection.  An After Visit Summary was printed and given to the patient at discharge, including (if requested) any available informative/educational handouts regarding diagnosis, treatment, or medications. All questions were answered to patient/family satisfaction. Should symptoms fail to improve or worsen they agree to call or return to clinic or to go to the Emergency Department. Discussed the importance of following up with any needed screening tests/labs/specialist appointments and any requested follow-up recommended by me today. Importance of maintaining follow-up discussed and patient accepts that missed appointments can delay diagnosis and potentially lead to worsening of conditions.  Return if symptoms worsen or fail to improve., or sooner if acute issues arise.      This document has been electronically signed by BETI Vieira on July 2, 2024 09:57 CDT

## 2024-07-02 ENCOUNTER — OFFICE VISIT (OUTPATIENT)
Age: 69
End: 2024-07-02
Payer: COMMERCIAL

## 2024-07-02 VITALS
OXYGEN SATURATION: 98 % | DIASTOLIC BLOOD PRESSURE: 84 MMHG | WEIGHT: 239 LBS | HEART RATE: 74 BPM | BODY MASS INDEX: 31.68 KG/M2 | SYSTOLIC BLOOD PRESSURE: 124 MMHG | HEIGHT: 73 IN | RESPIRATION RATE: 18 BRPM

## 2024-07-02 DIAGNOSIS — S90.852D: Primary | ICD-10-CM

## 2024-07-02 PROCEDURE — 99212 OFFICE O/P EST SF 10 MIN: CPT | Performed by: NURSE PRACTITIONER

## 2025-03-07 ENCOUNTER — TRANSCRIBE ORDERS (OUTPATIENT)
Facility: HOSPITAL | Age: 70
End: 2025-03-07

## 2025-03-07 DIAGNOSIS — E11.65 TYPE 2 DIABETES MELLITUS WITH HYPERGLYCEMIA, UNSPECIFIED WHETHER LONG TERM INSULIN USE (HCC): Primary | ICD-10-CM

## 2025-03-07 DIAGNOSIS — Z00.01 ENCOUNTER FOR GENERAL ADULT MEDICAL EXAMINATION WITH ABNORMAL FINDINGS: ICD-10-CM
